# Patient Record
Sex: FEMALE | URBAN - METROPOLITAN AREA
[De-identification: names, ages, dates, MRNs, and addresses within clinical notes are randomized per-mention and may not be internally consistent; named-entity substitution may affect disease eponyms.]

---

## 2021-01-11 ENCOUNTER — OFFICE VISIT (OUTPATIENT)
Dept: OBGYN CLINIC | Facility: CLINIC | Age: 36
End: 2021-01-11
Payer: COMMERCIAL

## 2021-01-11 VITALS — HEART RATE: 78 BPM | SYSTOLIC BLOOD PRESSURE: 137 MMHG | WEIGHT: 190.38 LBS | DIASTOLIC BLOOD PRESSURE: 90 MMHG

## 2021-01-11 DIAGNOSIS — Z01.419 ENCOUNTER FOR GYNECOLOGICAL EXAMINATION WITHOUT ABNORMAL FINDING: Primary | ICD-10-CM

## 2021-01-11 PROBLEM — N63.0 LUMP OR MASS IN BREAST: Status: ACTIVE | Noted: 2021-01-11

## 2021-01-11 PROBLEM — Z97.5 IUD (INTRAUTERINE DEVICE) IN PLACE: Status: ACTIVE | Noted: 2021-01-11

## 2021-01-11 PROCEDURE — 3075F SYST BP GE 130 - 139MM HG: CPT | Performed by: OBSTETRICS & GYNECOLOGY

## 2021-01-11 PROCEDURE — 3080F DIAST BP >= 90 MM HG: CPT | Performed by: OBSTETRICS & GYNECOLOGY

## 2021-01-11 PROCEDURE — 99385 PREV VISIT NEW AGE 18-39: CPT | Performed by: OBSTETRICS & GYNECOLOGY

## 2021-01-11 NOTE — PROGRESS NOTES
Shahnaz Mishra is a 37year old female [id number] Patient's last menstrual period was 12/01/2020. Patient presents with:  Gyn Exam: Annual exam   .     Her cycles are regular. She has no complaints. Pt has mirena April 2016.   I informed that it is now ef organization: Not on file        Attends meetings of clubs or organizations: Not on file        Relationship status: Not on file      Intimate partner violence        Fear of current or ex partner: Not on file        Emotionally abused: Not on file to auscultation bilaterally  Cardiovascular: regular rate and rhythm, no significant murmur  Abdomen:  soft, nontender, nondistended, no masses  Skin/Hair: no unusual rashes or bruises  Extremities: no edema, no cyanosis  Psychiatric:  Oriented to time, pl

## 2021-01-13 LAB — HPV I/H RISK 1 DNA SPEC QL NAA+PROBE: NEGATIVE

## 2021-09-10 ENCOUNTER — TELEPHONE (OUTPATIENT)
Dept: OBGYN CLINIC | Facility: CLINIC | Age: 36
End: 2021-09-10

## 2021-09-10 NOTE — TELEPHONE ENCOUNTER
Informed pt per [facility] visit notes in 01/2021 pt had Mirena inserted April 2016. Informed pt Mirena is effective for 6 years and she is not due for exchange until 4/2022. Pt agrees.    Assisted pt with scheduling annual.

## 2022-02-09 ENCOUNTER — OFFICE VISIT (OUTPATIENT)
Dept: OBGYN CLINIC | Facility: CLINIC | Age: 37
End: 2022-02-09
Payer: COMMERCIAL

## 2022-02-09 VITALS
HEART RATE: 76 BPM | HEIGHT: 68 IN | SYSTOLIC BLOOD PRESSURE: 113 MMHG | DIASTOLIC BLOOD PRESSURE: 71 MMHG | WEIGHT: 180 LBS | BODY MASS INDEX: 27.28 KG/M2

## 2022-02-09 DIAGNOSIS — N94.10 DYSPAREUNIA IN FEMALE: ICD-10-CM

## 2022-02-09 DIAGNOSIS — Z01.419 ENCOUNTER FOR GYNECOLOGICAL EXAMINATION WITHOUT ABNORMAL FINDING: Primary | ICD-10-CM

## 2022-02-09 PROCEDURE — 3074F SYST BP LT 130 MM HG: CPT | Performed by: OBSTETRICS & GYNECOLOGY

## 2022-02-09 PROCEDURE — 3078F DIAST BP <80 MM HG: CPT | Performed by: OBSTETRICS & GYNECOLOGY

## 2022-02-09 PROCEDURE — 3008F BODY MASS INDEX DOCD: CPT | Performed by: OBSTETRICS & GYNECOLOGY

## 2022-02-09 PROCEDURE — 99395 PREV VISIT EST AGE 18-39: CPT | Performed by: OBSTETRICS & GYNECOLOGY

## 2022-02-09 RX ORDER — SPIRONOLACTONE 100 MG/1
100 TABLET, FILM COATED ORAL 2 TIMES DAILY
COMMUNITY

## 2022-10-31 ENCOUNTER — OFFICE VISIT (OUTPATIENT)
Dept: OBGYN CLINIC | Facility: CLINIC | Age: 37
End: 2022-10-31
Payer: COMMERCIAL

## 2022-10-31 VITALS
DIASTOLIC BLOOD PRESSURE: 72 MMHG | WEIGHT: 192.19 LBS | SYSTOLIC BLOOD PRESSURE: 114 MMHG | BODY MASS INDEX: 29 KG/M2 | HEART RATE: 67 BPM

## 2022-10-31 DIAGNOSIS — N89.8 VAGINAL ODOR: Primary | ICD-10-CM

## 2022-10-31 DIAGNOSIS — N89.8 VAGINAL DISCHARGE: ICD-10-CM

## 2022-10-31 DIAGNOSIS — Z97.5 PRESENCE OF IUD: ICD-10-CM

## 2022-10-31 LAB — TRICHOMONAS SCREEN: NEGATIVE

## 2022-10-31 PROCEDURE — 99214 OFFICE O/P EST MOD 30 MIN: CPT | Performed by: NURSE PRACTITIONER

## 2022-10-31 PROCEDURE — 3074F SYST BP LT 130 MM HG: CPT | Performed by: NURSE PRACTITIONER

## 2022-10-31 PROCEDURE — 3078F DIAST BP <80 MM HG: CPT | Performed by: NURSE PRACTITIONER

## 2022-10-31 RX ORDER — METRONIDAZOLE 500 MG/1
500 TABLET ORAL 2 TIMES DAILY
Qty: 14 TABLET | Refills: 0 | Status: SHIPPED | OUTPATIENT
Start: 2022-10-31

## 2022-11-02 LAB
GENITAL VAGINOSIS SCREEN: NEGATIVE
TRICHOMONAS SCREEN: NEGATIVE

## 2023-07-07 ENCOUNTER — TELEPHONE (OUTPATIENT)
Dept: OBGYN CLINIC | Facility: CLINIC | Age: 38
End: 2023-07-07

## 2023-07-11 ENCOUNTER — OFFICE VISIT (OUTPATIENT)
Dept: OBGYN CLINIC | Facility: CLINIC | Age: 38
End: 2023-07-11

## 2023-07-11 VITALS
BODY MASS INDEX: 28 KG/M2 | DIASTOLIC BLOOD PRESSURE: 77 MMHG | WEIGHT: 187 LBS | SYSTOLIC BLOOD PRESSURE: 113 MMHG | HEART RATE: 71 BPM

## 2023-07-11 DIAGNOSIS — Z30.432 ENCOUNTER FOR REMOVAL OF INTRAUTERINE CONTRACEPTIVE DEVICE: Primary | ICD-10-CM

## 2023-07-11 PROBLEM — C50.919 BREAST CANCER (HCC): Status: ACTIVE | Noted: 2023-07-11

## 2023-07-11 PROCEDURE — 3074F SYST BP LT 130 MM HG: CPT | Performed by: OBSTETRICS & GYNECOLOGY

## 2023-07-11 PROCEDURE — 58301 REMOVE INTRAUTERINE DEVICE: CPT | Performed by: OBSTETRICS & GYNECOLOGY

## 2023-07-11 PROCEDURE — 3078F DIAST BP <80 MM HG: CPT | Performed by: OBSTETRICS & GYNECOLOGY

## 2023-07-11 RX ORDER — ROSUVASTATIN CALCIUM 5 MG/1
1 TABLET, COATED ORAL DAILY
COMMUNITY

## 2023-07-11 NOTE — PROCEDURES
IUD Removal   Pt was recently diagnosed with breast cancer. Will start chem next week and needs IUD removed. Pregnancy Results: negative from  n/a  test   Consent signed. Procedure discussed with the patient in detail including indication, risks, benefits, alternatives and complications. Pelvic Exam Findings:      Procedure:  Speculum placed in the vagina. Betadine wash of vagina and cervix. A Ring Forceps was used to grasp IUD strings. Mirena IUD was removed without difficulty. The patient tolerated the procedure well. Visit Plan:  Discharge instructions were reviewed with the patient.

## 2023-08-17 ENCOUNTER — PATIENT MESSAGE (OUTPATIENT)
Dept: OBGYN CLINIC | Facility: CLINIC | Age: 38
End: 2023-08-17

## 2023-08-21 NOTE — TELEPHONE ENCOUNTER
Agree with triage advice given, with the chemo, injections, recent IUD removal etc her menses may not be regular right now. Call if bleeding longer than 10 days at a time, soaking more than a pad per hour or symptoms of SOB or CP. May be a good idea to start an iron supp such as slow fe 1 tab po every day but check with oncologist to see if this is ok first with her chemo.

## (undated) NOTE — LETTER
AUTHORIZATION FOR SURGICAL OPERATION OR OTHER PROCEDURE    1. I hereby authorize Dr. Sincere Shen, and [place] [facility] staff assigned to my case to perform the following operation and/or procedure at the [place] [facility] [facility]:    IUD REMOVAL    2. My physician has explained the nature and purpose of the operation or other procedure, possible alternative methods of treatment, the risks involved, and the possibility of complication to me. I acknowledge that no guarantee has been made as to the result that may be obtained. 3.  I recognize that, during the course of this operation, or other procedure, unforseen conditions may necessitate additional or different procedure than those listed above. I, therefore, further authorize and request that the above named physician, his/her physician assistants or designees perform such procedures as are, in his/her professional opinion, necessary and desirable. 4.  Any tissue or organs removed in the operation or other procedure may be disposed of by and at the discretion of the [facility] and [facility]. 5.  I understand that in the event of a medical emergency, I will be transported by local paramedics to [facility] or other hospital emergency department. 6.  I certify that I have read and fully understand the above consent to operation and/or other procedure. 7.  I acknowledge that my physician has explained sedation/analgesia administration to me including the risks and benefits. I consent to the administration of sedation/analgesia as may be necessary or desirable in the judgement of my physician. Witness signature: ___________________________________________________ Date:  ______/______/_____                    Time:  ________ A. M.  P.M.        Patient Name:  ______________________________________________________  (please print)      Patient signature:  ___________________________________________________             Relationship to Patient:           []  Parent    Responsible person                          []  Spouse  In case of minor or                    [] Other  _____________   Incompetent name:  __________________________________________________                               (please print)      _____________      Responsible person  In case of minor or  Incompetent signature:  _______________________________________________    Statement of Physician  My signature below affirms that prior to the time of the procedure, I have explained to the patient and/or his/her guardian, the risks and benefits involved in the proposed treatment and any reasonable alternative to the proposed treatment. I have also explained the risks and benefits involved in the refusal of the proposed treatment and have answered the patient's questions.                         Date:  ______/______/_______  Provider                      Signature:  __________________________________________________________       Time:  ___________ A.M    P.M.

## (undated) NOTE — MR AVS SNAPSHOT
After Visit Summary   1/11/2021    Murlene Cockayne    MRN: [id number]           Visit Information     Date & Time  1/11/2021  4:20 PM Provider  Radha Wooten MD Formerly named [facility], [address] [place] [address],[address], [facility].  Phone  3 Communicate with a [address] [place] [address] Physician or [employer] online. The physician will respond and provide   a treatment plan within a few hours.  ONLINE VISIT  Primary Care Providers  Treatment for mild illness or injury that does not require immediate attention VIDEO VISITS